# Patient Record
Sex: FEMALE | Race: WHITE | NOT HISPANIC OR LATINO | Employment: UNEMPLOYED | URBAN - METROPOLITAN AREA
[De-identification: names, ages, dates, MRNs, and addresses within clinical notes are randomized per-mention and may not be internally consistent; named-entity substitution may affect disease eponyms.]

---

## 2022-11-14 ENCOUNTER — HOSPITAL ENCOUNTER (EMERGENCY)
Facility: HOSPITAL | Age: 47
Discharge: HOME | End: 2022-11-14
Attending: EMERGENCY MEDICINE

## 2022-11-14 ENCOUNTER — APPOINTMENT (EMERGENCY)
Dept: CT IMAGING | Facility: HOSPITAL | Age: 47
End: 2022-11-14

## 2022-11-14 ENCOUNTER — APPOINTMENT (OUTPATIENT)
Dept: RADIOLOGY | Facility: HOSPITAL | Age: 47
End: 2022-11-14

## 2022-11-14 VITALS
OXYGEN SATURATION: 97 % | WEIGHT: 180 LBS | DIASTOLIC BLOOD PRESSURE: 88 MMHG | SYSTOLIC BLOOD PRESSURE: 120 MMHG | TEMPERATURE: 98.2 F | HEART RATE: 89 BPM | RESPIRATION RATE: 19 BRPM

## 2022-11-14 DIAGNOSIS — R41.0 EPISODE OF CONFUSION: ICD-10-CM

## 2022-11-14 DIAGNOSIS — Z87.440 HISTORY OF UTI: Primary | ICD-10-CM

## 2022-11-14 LAB
ALBUMIN SERPL BCP-MCNC: 3.1 G/DL (ref 3.5–5)
ALP SERPL-CCNC: 69 U/L (ref 46–116)
ALT SERPL W P-5'-P-CCNC: 35 U/L (ref 12–78)
ANION GAP SERPL CALCULATED.3IONS-SCNC: 5 MMOL/L (ref 4–13)
APTT PPP: 29 SECONDS (ref 23–37)
AST SERPL W P-5'-P-CCNC: 31 U/L (ref 5–45)
BASOPHILS # BLD AUTO: 0.04 THOUSANDS/ÂΜL (ref 0–0.1)
BASOPHILS NFR BLD AUTO: 1 % (ref 0–1)
BILIRUB SERPL-MCNC: 0.22 MG/DL (ref 0.2–1)
BUN SERPL-MCNC: 14 MG/DL (ref 5–25)
CALCIUM ALBUM COR SERPL-MCNC: 9.9 MG/DL (ref 8.3–10.1)
CALCIUM SERPL-MCNC: 9.2 MG/DL (ref 8.3–10.1)
CARDIAC TROPONIN I PNL SERPL HS: <2 NG/L
CARDIAC TROPONIN I PNL SERPL HS: <2 NG/L
CHLORIDE SERPL-SCNC: 105 MMOL/L (ref 96–108)
CO2 SERPL-SCNC: 29 MMOL/L (ref 21–32)
CREAT SERPL-MCNC: 0.76 MG/DL (ref 0.6–1.3)
EOSINOPHIL # BLD AUTO: 0.2 THOUSAND/ÂΜL (ref 0–0.61)
EOSINOPHIL NFR BLD AUTO: 5 % (ref 0–6)
ERYTHROCYTE [DISTWIDTH] IN BLOOD BY AUTOMATED COUNT: 12.7 % (ref 11.6–15.1)
GFR SERPL CREATININE-BSD FRML MDRD: 95 ML/MIN/1.73SQ M
GLUCOSE SERPL-MCNC: 73 MG/DL (ref 65–140)
HCT VFR BLD AUTO: 37.2 % (ref 34.8–46.1)
HGB BLD-MCNC: 12 G/DL (ref 11.5–15.4)
IMM GRANULOCYTES # BLD AUTO: 0.03 THOUSAND/UL (ref 0–0.2)
IMM GRANULOCYTES NFR BLD AUTO: 1 % (ref 0–2)
INR PPP: 0.97 (ref 0.84–1.19)
LYMPHOCYTES # BLD AUTO: 1.34 THOUSANDS/ÂΜL (ref 0.6–4.47)
LYMPHOCYTES NFR BLD AUTO: 30 % (ref 14–44)
MCH RBC QN AUTO: 29.3 PG (ref 26.8–34.3)
MCHC RBC AUTO-ENTMCNC: 32.3 G/DL (ref 31.4–37.4)
MCV RBC AUTO: 91 FL (ref 82–98)
MONOCYTES # BLD AUTO: 0.81 THOUSAND/ÂΜL (ref 0.17–1.22)
MONOCYTES NFR BLD AUTO: 18 % (ref 4–12)
NEUTROPHILS # BLD AUTO: 2.05 THOUSANDS/ÂΜL (ref 1.85–7.62)
NEUTS SEG NFR BLD AUTO: 45 % (ref 43–75)
NRBC BLD AUTO-RTO: 0 /100 WBCS
PLATELET # BLD AUTO: 236 THOUSANDS/UL (ref 149–390)
PMV BLD AUTO: 9.3 FL (ref 8.9–12.7)
POTASSIUM SERPL-SCNC: 4.2 MMOL/L (ref 3.5–5.3)
PROT SERPL-MCNC: 7.6 G/DL (ref 6.4–8.4)
PROTHROMBIN TIME: 12.7 SECONDS (ref 11.6–14.5)
RBC # BLD AUTO: 4.1 MILLION/UL (ref 3.81–5.12)
SODIUM SERPL-SCNC: 139 MMOL/L (ref 135–147)
WBC # BLD AUTO: 4.47 THOUSAND/UL (ref 4.31–10.16)

## 2022-11-15 LAB
ATRIAL RATE: 84 BPM
P AXIS: 19 DEGREES
PR INTERVAL: 114 MS
QRS AXIS: 37 DEGREES
QRSD INTERVAL: 84 MS
QT INTERVAL: 362 MS
QTC INTERVAL: 427 MS
T WAVE AXIS: 6 DEGREES
VENTRICULAR RATE: 84 BPM

## 2022-11-15 NOTE — ED NOTES
Patient transported to [address] [place] [address] Drive, [address] [place] [address]  11/14/22 9179

## 2022-11-15 NOTE — ED PROVIDER NOTES
History  Chief Complaint   Patient presents with   • Chest Pain     Patient c/o chest pain that started two days ago  Patient c/o slurred speech that started on Thursday but has resolved  Patient had a headache yesterday that resolved  Patient denies dizziness  Patient currently being treated for a UTI  Patient states"friends told her she has been off for the last 3 weeks"  Kiara Baptiste is a 41 y o  female with a recent past medical history of UTI for which she is currently on Bactrim x3 days presenting today with transient confusion  The patient reports that about 3 days ago she began to experience altered status as per family members  Patient is awake alert and oriented x4 here in the emergency department has been for the past 2 days  Denies any headaches, dizziness, chest pain, shortness of breath at this time  Reports that when she was diagnosed with UTI she was experiencing frequency and dysuria  Reports that symptoms have significantly improved since being on antibiotics  At this time the patient has no other complaints  Just want to come in to get checked secondary to transient confusion  None       History reviewed  No pertinent past medical history  Past Surgical History:   Procedure Laterality Date   •  SECTION     • TUBAL LIGATION         History reviewed  No pertinent family history  I have reviewed and agree with the history as documented  E-Cigarette/Vaping   • E-Cigarette Use Never User      E-Cigarette/Vaping Substances     Social History     Tobacco Use   • Smoking status: Never Smoker   • Smokeless tobacco: Never Used   Vaping Use   • Vaping Use: Never used   Substance Use Topics   • Alcohol use: Never   • Drug use: Never       Review of Systems   Constitutional: Negative for activity change, chills, diaphoresis and fever  HENT: Negative for congestion, ear discharge, ear pain, rhinorrhea, sore throat and trouble swallowing      Eyes: Negative for pain, discharge, redness and visual disturbance  Respiratory: Negative for cough, chest tightness, shortness of breath and wheezing  Cardiovascular: Negative for chest pain, palpitations and leg swelling  Gastrointestinal: Negative for abdominal distention, abdominal pain, blood in stool, constipation, diarrhea, nausea and vomiting  Genitourinary: Negative for difficulty urinating, dysuria, flank pain, frequency, hematuria and urgency  Musculoskeletal: Negative for arthralgias, myalgias, neck pain and neck stiffness  Skin: Negative for color change and rash  Neurological: Negative for dizziness, syncope, facial asymmetry, weakness, light-headedness, numbness and headaches  Physical Exam  Physical Exam  Vitals and nursing note reviewed  Constitutional:       General: She is not in acute distress  Appearance: She is well-developed  She is not ill-appearing, toxic-appearing or diaphoretic  HENT:      Head: Normocephalic and atraumatic  Eyes:      Extraocular Movements: Extraocular movements intact  Conjunctiva/sclera: Conjunctivae normal       Pupils: Pupils are equal, round, and reactive to light  Cardiovascular:      Rate and Rhythm: Normal rate and regular rhythm  Heart sounds: No murmur heard  Pulmonary:      Effort: Pulmonary effort is normal  No respiratory distress  Breath sounds: Normal breath sounds  Abdominal:      Palpations: Abdomen is soft  Tenderness: There is no abdominal tenderness  Musculoskeletal:      Cervical back: Normal range of motion and neck supple  Skin:     General: Skin is warm and dry  Capillary Refill: Capillary refill takes less than 2 seconds  Neurological:      General: No focal deficit present  Mental Status: She is alert and oriented to person, place, and time           Vital Signs  ED Triage Vitals [11/14/22 1757]   Temperature Pulse Respirations Blood Pressure SpO2   98 2 °F (36 8 °C) 95 18 156/79 99 %      Temp Source Heart Rate Source Patient Position - Orthostatic VS BP Location FiO2 (%)   Temporal Monitor Sitting Left arm --      Pain Score       --           Vitals:    11/14/22 1757 11/14/22 2209   BP: 156/79 120/88   Pulse: 95 89   Patient Position - Orthostatic VS: Sitting          Visual Acuity  Visual Acuity    Flowsheet Row Most Recent Value   L Pupil Size (mm) 3   R Pupil Size (mm) 3          ED Medications  Medications - No data to display    Diagnostic Studies  Results Reviewed     Procedure Component Value Units Date/Time    HS Troponin I 2hr [[id number]] Collected: 11/14/22 2035    Lab Status: Final result Specimen: Blood from Arm, Left Updated: 11/14/22 2109     hs TnI 2hr <2 ng/L      Delta 2hr hsTnI --    HS Troponin 0hr (reflex protocol) [[id number]]  (Normal) Collected: 11/14/22 1804    Lab Status: Final result Specimen: Blood from Arm, Left Updated: 11/14/22 1847     hs TnI 0hr <2 ng/L     Comprehensive metabolic panel [[id number]]  (Abnormal) Collected: 11/14/22 1804    Lab Status: Final result Specimen: Blood from Arm, Left Updated: 11/14/22 1839     Sodium 139 mmol/L      Potassium 4 2 mmol/L      Chloride 105 mmol/L      CO2 29 mmol/L      ANION GAP 5 mmol/L      BUN 14 mg/dL      Creatinine 0 76 mg/dL      Glucose 73 mg/dL      Calcium 9 2 mg/dL      Corrected Calcium 9 9 mg/dL      AST 31 U/L      ALT 35 U/L      Alkaline Phosphatase 69 U/L      Total Protein 7 6 g/dL      Albumin 3 1 g/dL      Total Bilirubin 0 22 mg/dL      eGFR 95 ml/min/1 73sq m     Narrative:      [place] guidelines for Chronic Kidney Disease (CKD):   •  Stage 1 with normal or high GFR (GFR > 90 mL/min/1 73 square meters)  •  Stage 2 Mild CKD (GFR = 60-89 mL/min/1 73 square meters)  •  Stage 3A Moderate CKD (GFR = 45-59 mL/min/1 73 square meters)  •  Stage 3B Moderate CKD (GFR = 30-44 mL/min/1 73 square meters)  •  Stage 4 Severe CKD (GFR = 15-29 mL/min/1 73 square meters)  •  Stage 5 End Stage CKD (GFR <15 mL/min/1 73 square meters)  Note: GFR calculation is accurate only with a steady state creatinine    Protime-INR [[id number]]  (Normal) Collected: 11/14/22 1804    Lab Status: Final result Specimen: Blood from Arm, Left Updated: 11/14/22 1832     Protime 12 7 seconds      INR 0 97    APTT [[id number]]  (Normal) Collected: 11/14/22 1804    Lab Status: Final result Specimen: Blood from Arm, Left Updated: 11/14/22 1832     PTT 29 seconds     CBC and differential [[id number]]  (Abnormal) Collected: 11/14/22 1804    Lab Status: Final result Specimen: Blood from Arm, Left Updated: 11/14/22 1811     WBC 4 47 Thousand/uL      RBC 4 10 Million/uL      Hemoglobin 12 0 g/dL      Hematocrit 37 2 %      MCV 91 fL      MCH 29 3 pg      MCHC 32 3 g/dL      RDW 12 7 %      MPV 9 3 fL      Platelets 578 Thousands/uL      nRBC 0 /100 WBCs      Neutrophils Relative 45 %      Immat GRANS % 1 %      Lymphocytes Relative 30 %      Monocytes Relative 18 %      Eosinophils Relative 5 %      Basophils Relative 1 %      Neutrophils Absolute 2 05 Thousands/µL      Immature Grans Absolute 0 03 Thousand/uL      Lymphocytes Absolute 1 34 Thousands/µL      Monocytes Absolute 0 81 Thousand/µL      Eosinophils Absolute 0 20 Thousand/µL      Basophils Absolute 0 04 Thousands/µL                  CT head without contrast   Final Result by Selma Rojas DO (11/14 2130)      No acute intracranial abnormality                    Workstation performed: [id number]         XR chest pa & lateral    (Results Pending)              Procedures  Procedures         ED Course                                             MDM  Number of Diagnoses or Management Options  Episode of confusion: new and requires workup  History of UTI: minor     Amount and/or Complexity of Data Reviewed  Clinical lab tests: ordered and reviewed  Tests in the radiology section of CPT®: ordered and reviewed  Tests in the medicine section of CPT®: ordered and reviewed  Review and summarize past medical records: yes  Discuss the patient with other providers: yes  Independent visualization of images, tracings, or specimens: yes    Risk of Complications, Morbidity, and/or Mortality  Presenting problems: moderate  Diagnostic procedures: moderate  Management options: moderate    Patient Progress  Patient progress: stable      Disposition  Final diagnoses:   History of UTI   Episode of confusion     Time reflects when diagnosis was documented in both MDM as applicable and the Disposition within this note     Time User Action Codes Description Comment    11/14/2022  9:56 PM [place] Other Add [Z87 440] History of UTI     11/14/2022 10:00 PM [place] Other Add [R41 0] Episode of confusion     11/14/2022 10:00 PM [place] Other Modify [M09 101] History of UTI     11/14/2022 10:00 PM [place] Other Modify [R41 0] Episode of confusion     11/14/2022 10:00 PM [place] Other Modify [K22 606] History of UTI     11/14/2022 10:00 PM [place] Other Modify [R41 0] Episode of confusion       ED Disposition     ED Disposition   Discharge    Condition   Stable    Date/Time   Mon Nov 14, 2022  9:55 PM    Comment   [address] discharge to home/self care  Follow-up Information     Follow up With Specialties Details Why Contact Info Additional [facility] Emergency Department Emergency Medicine Go to  If symptoms worsen [address] Emergency Department, [facility], Tanisha Bautista MD  Call today To schedule an appointment for follow-up within the next 2-3 days for reevaluation [id number] [facility] [address] [phone]             There are no discharge medications for this patient  No discharge procedures on file      PDMP Review     None          ED Provider  Electronically Signed by           Armond Rae PA-C  11/15/22 0313

## 2024-02-29 NOTE — DISCHARGE INSTRUCTIONS
Please return to the ED if you begin to experience any new or worsening symptoms, chest pain, shortness of breath, lightheadedness, dizziness, changes to vision, passing out, numbness, tingling, or weakness in the extremities, difficulty walking/swallowing/breathing, fevers or chills  [No Acute Distress] : no acute distress [Normal Sclera/Conjunctiva] : normal sclera/conjunctiva [Normal Outer Ear/Nose] : the outer ears and nose were normal in appearance [No JVD] : no jugular venous distention [No Respiratory Distress] : no respiratory distress  [Normal Gait] : normal gait [Normal Affect] : the affect was normal [de-identified] : left index finger ulcer on side of nail bed